# Patient Record
Sex: MALE | URBAN - METROPOLITAN AREA
[De-identification: names, ages, dates, MRNs, and addresses within clinical notes are randomized per-mention and may not be internally consistent; named-entity substitution may affect disease eponyms.]

---

## 2021-07-01 ENCOUNTER — OFFICE VISIT (OUTPATIENT)
Dept: URBAN - METROPOLITAN AREA CLINIC 17 | Facility: CLINIC | Age: 86
End: 2021-07-01
Payer: COMMERCIAL

## 2021-07-01 DIAGNOSIS — H33.321 ROUND HOLE OF RETINA OF RIGHT EYE: Primary | ICD-10-CM

## 2021-07-01 DIAGNOSIS — H35.432 PAVING STONE DEGENERATION OF RETINA, LEFT EYE: ICD-10-CM

## 2021-07-01 DIAGNOSIS — Z96.1 PRESENCE OF PSEUDOPHAKIA: ICD-10-CM

## 2021-07-01 DIAGNOSIS — H18.593 OTHER HEREDITARY CORNEAL DYSTROPHIES, BILATERAL: ICD-10-CM

## 2021-07-01 PROCEDURE — 99204 OFFICE O/P NEW MOD 45 MIN: CPT | Performed by: OPTOMETRIST

## 2021-07-01 ASSESSMENT — INTRAOCULAR PRESSURE
OD: 17
OS: 18

## 2021-07-01 NOTE — IMPRESSION/PLAN
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 Plan: Discussed diagnosis in detail with patient. Consult recommended [[facility]].

## 2021-07-01 NOTE — IMPRESSION/PLAN
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 Plan: No treatment is required at this time. Will continue to observe condition and or symptoms.

## 2021-07-01 NOTE — IMPRESSION/PLAN
Impression: Presence of pseudophakia: Z96.1. Plan: IOL(s) in good position. Continue to monitor with yearly dilated eye exams.

## 2021-07-01 NOTE — IMPRESSION/PLAN
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 Plan: Discussed diagnosis in detail with patient. Will continue to observe condition and or symptoms. Recommended pt to use AFT frequently.

## 2021-07-02 ENCOUNTER — OFFICE VISIT (OUTPATIENT)
Dept: URBAN - METROPOLITAN AREA CLINIC 17 | Facility: CLINIC | Age: 86
End: 2021-07-02
Payer: COMMERCIAL

## 2021-07-02 DIAGNOSIS — H52.4 PRESBYOPIA: Primary | ICD-10-CM

## 2021-07-02 PROCEDURE — 92012 INTRM OPH EXAM EST PATIENT: CPT | Performed by: OPTOMETRIST

## 2021-07-02 ASSESSMENT — INTRAOCULAR PRESSURE
OD: 14
OS: 12

## 2021-07-02 ASSESSMENT — VISUAL ACUITY
OD: 20/30
OS: 20/30

## 2021-08-04 ENCOUNTER — OFFICE VISIT (OUTPATIENT)
Dept: URBAN - METROPOLITAN AREA CLINIC 17 | Facility: CLINIC | Age: 86
End: 2021-08-04
Payer: COMMERCIAL

## 2021-08-04 PROCEDURE — 92134 CPTRZ OPH DX IMG PST SGM RTA: CPT | Performed by: OPHTHALMOLOGY

## 2021-08-04 PROCEDURE — 99204 OFFICE O/P NEW MOD 45 MIN: CPT | Performed by: OPHTHALMOLOGY

## 2021-08-04 ASSESSMENT — INTRAOCULAR PRESSURE
OD: 16
OS: 14

## 2021-08-04 NOTE — IMPRESSION/PLAN
Impression: Round hole, right eye: H33.321. Right. Condition: stable. Vision: vision not affected. Plan: Discussed diagnosis in detail with patient. Exam OD shows laser treatment surrounding Retinal Hole - well treated. OCT OD is stable and Optos OD shows extensive scar surrounding retinal - appears stable. Advise the patient that his retina is stable. No treatment is required at this time. Recommend to continue eye care with Dr Jorge Wang and follow - up in 1 yr, sooner if there is a change or decrease in vision.

## 2022-07-07 ENCOUNTER — OFFICE VISIT (OUTPATIENT)
Dept: URBAN - METROPOLITAN AREA CLINIC 17 | Facility: CLINIC | Age: 87
End: 2022-07-07
Payer: COMMERCIAL

## 2022-07-07 DIAGNOSIS — H52.4 PRESBYOPIA: Primary | ICD-10-CM

## 2022-07-07 PROCEDURE — 92014 COMPRE OPH EXAM EST PT 1/>: CPT | Performed by: OPTOMETRIST

## 2022-07-07 ASSESSMENT — VISUAL ACUITY
OS: 20/30
OD: 20/40

## 2022-07-07 ASSESSMENT — INTRAOCULAR PRESSURE
OD: 14
OS: 12

## 2022-09-09 ENCOUNTER — OFFICE VISIT (OUTPATIENT)
Dept: URBAN - METROPOLITAN AREA CLINIC 40 | Facility: CLINIC | Age: 87
End: 2022-09-09
Payer: COMMERCIAL

## 2022-09-09 DIAGNOSIS — H47.233 GLAUCOMATOUS OPTIC ATROPHY, BILATERAL: Primary | ICD-10-CM

## 2022-09-09 DIAGNOSIS — H33.321 ROUND HOLE OF RETINA OF RIGHT EYE: ICD-10-CM

## 2022-09-09 PROCEDURE — 99214 OFFICE O/P EST MOD 30 MIN: CPT | Performed by: OPTOMETRIST

## 2022-09-09 ASSESSMENT — KERATOMETRY
OS: 44.63
OD: 44.50

## 2022-09-09 ASSESSMENT — INTRAOCULAR PRESSURE
OD: 15
OS: 14

## 2022-09-09 NOTE — IMPRESSION/PLAN
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 Plan: OD: Discussed diagnosis in detail with patient. Will continue to observe condition and or symptoms. Call if [address] worsens.

## 2023-09-11 ENCOUNTER — OFFICE VISIT (OUTPATIENT)
Dept: URBAN - METROPOLITAN AREA CLINIC 40 | Facility: CLINIC | Age: 88
End: 2023-09-11
Payer: MEDICARE

## 2023-09-11 DIAGNOSIS — H31.22 PERIPAPILLARY CHOROIDAL DYSTROPHY: Primary | ICD-10-CM

## 2023-09-11 DIAGNOSIS — H04.123 DRY EYE SYNDROME OF BILATERAL LACRIMAL GLANDS: ICD-10-CM

## 2023-09-11 PROCEDURE — 99214 OFFICE O/P EST MOD 30 MIN: CPT | Performed by: OPTOMETRIST

## 2023-09-11 ASSESSMENT — INTRAOCULAR PRESSURE
OS: 16
OD: 16

## 2023-09-11 ASSESSMENT — KERATOMETRY
OS: 44.75
OD: 45.00

## 2023-09-18 ENCOUNTER — OFFICE VISIT (OUTPATIENT)
Dept: URBAN - METROPOLITAN AREA CLINIC 40 | Facility: CLINIC | Age: 88
End: 2023-09-18
Payer: COMMERCIAL

## 2023-09-18 DIAGNOSIS — H52.4 PRESBYOPIA: Primary | ICD-10-CM

## 2023-09-18 PROCEDURE — 92014 COMPRE OPH EXAM EST PT 1/>: CPT | Performed by: OPTOMETRIST

## 2023-09-18 ASSESSMENT — INTRAOCULAR PRESSURE
OS: 14
OD: 15

## 2023-09-18 ASSESSMENT — KERATOMETRY
OS: 44.63
OD: 44.38

## 2023-09-18 ASSESSMENT — VISUAL ACUITY
OD: 20/25
OS: 20/25

## 2024-11-14 ENCOUNTER — OFFICE VISIT (OUTPATIENT)
Dept: URBAN - METROPOLITAN AREA CLINIC 40 | Facility: CLINIC | Age: 89
End: 2024-11-14
Payer: MEDICARE

## 2024-11-14 DIAGNOSIS — H31.22 CHOROIDAL DYSTROPHY (CENTRAL AREOLAR) (GENERALIZED) (PERIPAPILLARY): Primary | ICD-10-CM

## 2024-11-14 DIAGNOSIS — H04.123 DRY EYE SYNDROME OF BILATERAL LACRIMAL GLANDS: ICD-10-CM

## 2024-11-14 PROCEDURE — 99214 OFFICE O/P EST MOD 30 MIN: CPT | Performed by: OPTOMETRIST

## 2024-11-14 ASSESSMENT — KERATOMETRY
OD: 44.63
OS: 44.25

## 2024-11-14 ASSESSMENT — INTRAOCULAR PRESSURE
OS: 16
OD: 15